# Patient Record
Sex: MALE | Race: WHITE | Employment: FULL TIME
[De-identification: names, ages, dates, MRNs, and addresses within clinical notes are randomized per-mention and may not be internally consistent; named-entity substitution may affect disease eponyms.]

---

## 2017-01-09 ENCOUNTER — TELEPHONE (OUTPATIENT)
Dept: FAMILY MEDICINE CLINIC | Facility: CLINIC | Age: 62
End: 2017-01-09

## 2017-01-09 DIAGNOSIS — E11.40 CONTROLLED TYPE 2 DIABETES WITH NEUROPATHY (HCC): ICD-10-CM

## 2017-01-09 DIAGNOSIS — Z12.5 SPECIAL SCREENING FOR MALIGNANT NEOPLASM OF PROSTATE: ICD-10-CM

## 2017-01-09 DIAGNOSIS — E78.2 MIXED HYPERLIPIDEMIA: ICD-10-CM

## 2017-01-09 DIAGNOSIS — E03.9 HYPOTHYROIDISM, UNSPECIFIED TYPE: Primary | ICD-10-CM

## 2017-01-13 RX ORDER — LEVALBUTEROL TARTRATE 45 UG/1
2 AEROSOL, METERED ORAL EVERY 4 HOURS PRN
Qty: 1 INHALER | Refills: 3 | Status: SHIPPED | OUTPATIENT
Start: 2017-01-13 | End: 2017-04-24 | Stop reason: ALTCHOICE

## 2017-01-18 DIAGNOSIS — E03.9 HYPOTHYROIDISM, UNSPECIFIED TYPE: ICD-10-CM

## 2017-01-18 RX ORDER — LEVOTHYROXINE SODIUM 0.07 MG/1
75 TABLET ORAL DAILY
Qty: 90 TABLET | Refills: 0 | Status: SHIPPED | OUTPATIENT
Start: 2017-01-18 | End: 2017-03-20 | Stop reason: SDUPTHER

## 2017-01-31 ENCOUNTER — OFFICE VISIT (OUTPATIENT)
Dept: FAMILY MEDICINE CLINIC | Facility: CLINIC | Age: 62
End: 2017-01-31

## 2017-01-31 VITALS
HEIGHT: 68 IN | OXYGEN SATURATION: 98 % | BODY MASS INDEX: 31.83 KG/M2 | WEIGHT: 210 LBS | DIASTOLIC BLOOD PRESSURE: 78 MMHG | HEART RATE: 88 BPM | TEMPERATURE: 97.3 F | SYSTOLIC BLOOD PRESSURE: 124 MMHG

## 2017-01-31 DIAGNOSIS — H61.23 HEARING LOSS DUE TO CERUMEN IMPACTION, BILATERAL: ICD-10-CM

## 2017-01-31 DIAGNOSIS — E83.52 SERUM CALCIUM ELEVATED: ICD-10-CM

## 2017-01-31 PROCEDURE — 99214 OFFICE O/P EST MOD 30 MIN: CPT | Performed by: NURSE PRACTITIONER

## 2017-01-31 PROCEDURE — 69210 REMOVE IMPACTED EAR WAX UNI: CPT | Performed by: NURSE PRACTITIONER

## 2017-01-31 ASSESSMENT — ENCOUNTER SYMPTOMS
CHEST TIGHTNESS: 0
BLURRED VISION: 0
ABDOMINAL PAIN: 0
VISUAL CHANGE: 0
SHORTNESS OF BREATH: 0
COUGH: 0

## 2017-02-02 ENCOUNTER — TELEPHONE (OUTPATIENT)
Dept: FAMILY MEDICINE CLINIC | Facility: CLINIC | Age: 62
End: 2017-02-02

## 2017-02-10 RX ORDER — GLYBURIDE-METFORMIN HYDROCHLORIDE 2.5; 5 MG/1; MG/1
1 TABLET ORAL
Qty: 30 TABLET | Refills: 3 | Status: SHIPPED | OUTPATIENT
Start: 2017-02-10 | End: 2017-06-06 | Stop reason: SDUPTHER

## 2017-02-17 RX ORDER — MONTELUKAST SODIUM 10 MG/1
TABLET ORAL
Qty: 90 TABLET | Refills: 1 | Status: SHIPPED | OUTPATIENT
Start: 2017-02-17 | End: 2017-05-19 | Stop reason: SDUPTHER

## 2017-02-17 RX ORDER — QUETIAPINE FUMARATE 100 MG/1
TABLET, FILM COATED ORAL
Qty: 90 TABLET | Refills: 2 | Status: SHIPPED | OUTPATIENT
Start: 2017-02-17

## 2017-02-17 RX ORDER — FENOFIBRATE 145 MG/1
TABLET, COATED ORAL
Qty: 90 TABLET | Refills: 1 | Status: SHIPPED | OUTPATIENT
Start: 2017-02-17 | End: 2017-05-19 | Stop reason: SDUPTHER

## 2017-02-17 RX ORDER — ATORVASTATIN CALCIUM 40 MG/1
TABLET, FILM COATED ORAL
Qty: 90 TABLET | Refills: 1 | Status: SHIPPED | OUTPATIENT
Start: 2017-02-17 | End: 2017-05-19 | Stop reason: SDUPTHER

## 2017-03-20 DIAGNOSIS — E03.9 HYPOTHYROIDISM, UNSPECIFIED TYPE: ICD-10-CM

## 2017-03-20 RX ORDER — LEVOTHYROXINE SODIUM 0.07 MG/1
TABLET ORAL
Qty: 90 TABLET | Refills: 3 | Status: SHIPPED | OUTPATIENT
Start: 2017-03-20

## 2017-03-21 ENCOUNTER — HOSPITAL ENCOUNTER (OUTPATIENT)
Age: 62
Discharge: HOME | End: 2017-03-21
Payer: COMMERCIAL

## 2017-03-21 DIAGNOSIS — E83.52 SERUM CALCIUM ELEVATED: ICD-10-CM

## 2017-03-21 LAB
CALCIUM IONIZED: 1.37 MMOL/L (ref 1.13–1.33)
PTH INTACT: 32.16 PG/ML (ref 15–65)

## 2017-03-21 PROCEDURE — 82330 ASSAY OF CALCIUM: CPT

## 2017-03-21 PROCEDURE — 36415 COLL VENOUS BLD VENIPUNCTURE: CPT

## 2017-03-21 PROCEDURE — 83970 ASSAY OF PARATHORMONE: CPT

## 2017-04-07 RX ORDER — CALCIUM CITRATE/VITAMIN D3 200MG-6.25
TABLET ORAL
Qty: 50 STRIP | Refills: 1 | Status: SHIPPED | OUTPATIENT
Start: 2017-04-07 | End: 2017-07-17 | Stop reason: SDUPTHER

## 2017-04-18 ENCOUNTER — OFFICE VISIT (OUTPATIENT)
Dept: FAMILY MEDICINE CLINIC | Age: 62
End: 2017-04-18
Payer: COMMERCIAL

## 2017-04-18 VITALS — TEMPERATURE: 97.8 F | HEART RATE: 88 BPM | DIASTOLIC BLOOD PRESSURE: 65 MMHG | SYSTOLIC BLOOD PRESSURE: 107 MMHG

## 2017-04-18 DIAGNOSIS — J40 BRONCHITIS: Primary | ICD-10-CM

## 2017-04-18 DIAGNOSIS — R05.9 COUGH: ICD-10-CM

## 2017-04-18 PROCEDURE — 99214 OFFICE O/P EST MOD 30 MIN: CPT | Performed by: NURSE PRACTITIONER

## 2017-04-18 RX ORDER — GUAIFENESIN AND CODEINE PHOSPHATE 100; 10 MG/5ML; MG/5ML
5 SOLUTION ORAL 4 TIMES DAILY PRN
Qty: 118 ML | Refills: 0 | Status: SHIPPED | OUTPATIENT
Start: 2017-04-18 | End: 2017-04-24 | Stop reason: SDUPTHER

## 2017-04-18 RX ORDER — AZITHROMYCIN 250 MG/1
TABLET, FILM COATED ORAL
Qty: 1 PACKET | Refills: 0 | Status: SHIPPED | OUTPATIENT
Start: 2017-04-18 | End: 2018-03-17 | Stop reason: ALTCHOICE

## 2017-04-18 ASSESSMENT — ENCOUNTER SYMPTOMS
VOICE CHANGE: 0
COUGH: 1
EYE DISCHARGE: 0
CHEST TIGHTNESS: 0
SORE THROAT: 0
WHEEZING: 0
SINUS PRESSURE: 0
SHORTNESS OF BREATH: 0
EYE REDNESS: 0

## 2017-04-24 ENCOUNTER — OFFICE VISIT (OUTPATIENT)
Dept: FAMILY MEDICINE CLINIC | Age: 62
End: 2017-04-24
Payer: COMMERCIAL

## 2017-04-24 VITALS
DIASTOLIC BLOOD PRESSURE: 62 MMHG | SYSTOLIC BLOOD PRESSURE: 127 MMHG | BODY MASS INDEX: 30.98 KG/M2 | OXYGEN SATURATION: 98 % | HEIGHT: 68 IN | WEIGHT: 204.4 LBS | TEMPERATURE: 98.6 F | HEART RATE: 96 BPM

## 2017-04-24 DIAGNOSIS — R05.9 COUGH: ICD-10-CM

## 2017-04-24 DIAGNOSIS — J20.9 ACUTE BRONCHITIS, UNSPECIFIED ORGANISM: Primary | ICD-10-CM

## 2017-04-24 PROCEDURE — 99213 OFFICE O/P EST LOW 20 MIN: CPT | Performed by: NURSE PRACTITIONER

## 2017-04-24 RX ORDER — GUAIFENESIN AND CODEINE PHOSPHATE 100; 10 MG/5ML; MG/5ML
5 SOLUTION ORAL 4 TIMES DAILY PRN
Qty: 118 ML | Refills: 0 | Status: SHIPPED | OUTPATIENT
Start: 2017-04-24 | End: 2017-05-01

## 2017-04-24 RX ORDER — ALBUTEROL SULFATE 90 UG/1
2 AEROSOL, METERED RESPIRATORY (INHALATION) EVERY 6 HOURS PRN
Qty: 1 INHALER | Refills: 0 | Status: SHIPPED | OUTPATIENT
Start: 2017-04-24 | End: 2020-03-12 | Stop reason: SDUPTHER

## 2017-04-24 RX ORDER — METHYLPREDNISOLONE 4 MG/1
TABLET ORAL
Qty: 1 KIT | Refills: 0 | Status: SHIPPED | OUTPATIENT
Start: 2017-04-24 | End: 2017-04-30

## 2017-04-24 ASSESSMENT — ENCOUNTER SYMPTOMS
RHINORRHEA: 0
TROUBLE SWALLOWING: 0
SHORTNESS OF BREATH: 0
CONSTIPATION: 0
DIARRHEA: 0
COUGH: 1
SORE THROAT: 0
CHEST TIGHTNESS: 0
APNEA: 0
NAUSEA: 1
WHEEZING: 0
SINUS PRESSURE: 0
STRIDOR: 0
ABDOMINAL PAIN: 0
CHOKING: 0

## 2017-04-27 ENCOUNTER — OFFICE VISIT (OUTPATIENT)
Dept: FAMILY MEDICINE CLINIC | Age: 62
End: 2017-04-27
Payer: COMMERCIAL

## 2017-04-27 VITALS
HEART RATE: 98 BPM | RESPIRATION RATE: 17 BRPM | BODY MASS INDEX: 30.97 KG/M2 | SYSTOLIC BLOOD PRESSURE: 105 MMHG | OXYGEN SATURATION: 98 % | WEIGHT: 204.37 LBS | TEMPERATURE: 98.2 F | HEIGHT: 68 IN | DIASTOLIC BLOOD PRESSURE: 59 MMHG

## 2017-04-27 DIAGNOSIS — R05.9 COUGH: Primary | ICD-10-CM

## 2017-04-27 DIAGNOSIS — J40 BRONCHITIS: ICD-10-CM

## 2017-04-27 PROCEDURE — 99214 OFFICE O/P EST MOD 30 MIN: CPT | Performed by: NURSE PRACTITIONER

## 2017-04-27 RX ORDER — LEVOFLOXACIN 500 MG/1
500 TABLET, FILM COATED ORAL DAILY
Qty: 7 TABLET | Refills: 0 | Status: SHIPPED | OUTPATIENT
Start: 2017-04-27 | End: 2017-05-04

## 2017-04-27 ASSESSMENT — ENCOUNTER SYMPTOMS
WHEEZING: 1
EYE REDNESS: 0
SHORTNESS OF BREATH: 0
SORE THROAT: 0
COUGH: 1
CHEST TIGHTNESS: 0
VOICE CHANGE: 0
SINUS PRESSURE: 0
EYE DISCHARGE: 0

## 2017-05-01 ENCOUNTER — TELEPHONE (OUTPATIENT)
Dept: FAMILY MEDICINE CLINIC | Age: 62
End: 2017-05-01

## 2017-05-01 DIAGNOSIS — R93.89 ABNORMAL CXR: Primary | ICD-10-CM

## 2017-05-25 DIAGNOSIS — K21.9 GASTROESOPHAGEAL REFLUX DISEASE, ESOPHAGITIS PRESENCE NOT SPECIFIED: ICD-10-CM

## 2017-05-25 RX ORDER — MELOXICAM 15 MG/1
TABLET ORAL
Qty: 90 TABLET | Refills: 2 | Status: SHIPPED | OUTPATIENT
Start: 2017-05-25

## 2017-05-25 RX ORDER — OMEPRAZOLE 40 MG/1
40 CAPSULE, DELAYED RELEASE ORAL DAILY
Qty: 90 CAPSULE | Refills: 3 | Status: SHIPPED | OUTPATIENT
Start: 2017-05-25

## 2017-06-06 RX ORDER — GLYBURIDE-METFORMIN HYDROCHLORIDE 2.5; 5 MG/1; MG/1
1 TABLET ORAL
Qty: 30 TABLET | Refills: 3 | Status: SHIPPED | OUTPATIENT
Start: 2017-06-06

## 2018-03-17 ENCOUNTER — OFFICE VISIT (OUTPATIENT)
Dept: FAMILY MEDICINE CLINIC | Age: 63
End: 2018-03-17
Payer: COMMERCIAL

## 2018-03-17 VITALS
HEART RATE: 86 BPM | DIASTOLIC BLOOD PRESSURE: 68 MMHG | HEIGHT: 68 IN | BODY MASS INDEX: 33.34 KG/M2 | RESPIRATION RATE: 18 BRPM | OXYGEN SATURATION: 97 % | WEIGHT: 220 LBS | TEMPERATURE: 97.5 F | SYSTOLIC BLOOD PRESSURE: 138 MMHG

## 2018-03-17 DIAGNOSIS — J01.90 ACUTE BACTERIAL SINUSITIS: Primary | ICD-10-CM

## 2018-03-17 DIAGNOSIS — B96.89 ACUTE BACTERIAL SINUSITIS: Primary | ICD-10-CM

## 2018-03-17 PROCEDURE — 99213 OFFICE O/P EST LOW 20 MIN: CPT | Performed by: INTERNAL MEDICINE

## 2018-03-17 RX ORDER — AZITHROMYCIN 500 MG/1
500 TABLET, FILM COATED ORAL DAILY
Qty: 1 PACKET | Refills: 0 | Status: SHIPPED | OUTPATIENT
Start: 2018-03-17 | End: 2018-03-22

## 2018-03-17 ASSESSMENT — ENCOUNTER SYMPTOMS
SORE THROAT: 1
HOARSE VOICE: 1
COUGH: 1
RHINORRHEA: 1
SINUS PAIN: 0
SINUS PRESSURE: 0
SHORTNESS OF BREATH: 0

## 2018-03-17 ASSESSMENT — PATIENT HEALTH QUESTIONNAIRE - PHQ9
SUM OF ALL RESPONSES TO PHQ9 QUESTIONS 1 & 2: 0
2. FEELING DOWN, DEPRESSED OR HOPELESS: 0
SUM OF ALL RESPONSES TO PHQ QUESTIONS 1-9: 0
1. LITTLE INTEREST OR PLEASURE IN DOING THINGS: 0

## 2018-03-17 NOTE — PROGRESS NOTES
[facility]  [address]  [address] [address]  Dept: [phone]  Dept Fax: [phone]    Kiersten Reynolds is a 57 y.o. male who presents to the urgent care today for his medical conditions/complaints as noted below. Placido Bryant is c/o of Sinus Problem (pt complains of sinus congestion- noticed within the last week. pt states last year he was in the ER with pnuemo.); Cough (pt complains of mucus coming up whe coughing- yellowish ); and Ear Fullness (pt complains of ear blockage in both ears- no pain. )      HPI:     Sinusitis   This is a new problem. The current episode started 1 to 4 weeks ago. The problem has been gradually worsening since onset. There has been no fever. Associated symptoms include congestion, coughing, a hoarse voice and a sore throat. Pertinent negatives include no chills, headaches, shortness of breath or sinus pressure. Treatments tried: singulair at night. The treatment provided no relief.        Past Medical History:   Diagnosis Date    Hyperlipidemia     Hypothyroidism     Insomnia, unspecified     Type II or unspecified type diabetes mellitus without mention of complication, not stated as uncontrolled         Current Outpatient Prescriptions   Medication Sig Dispense Refill    glucose blood VI test strips (TRUE METRIX BLOOD GLUCOSE TEST) strip TEST twice a day 50 strip 1    glyBURIDE-metformin (GLUCOVANCE) 2.5-500 MG per tablet Take 1 tablet by mouth daily (with breakfast) 30 tablet 3    meloxicam (MOBIC) 15 MG tablet Take 1 tablet by mouth once a day 90 tablet 2    omeprazole (PRILOSEC) 40 MG delayed release capsule Take 1 capsule by mouth daily 90 capsule 3    valsartan-hydrochlorothiazide (DIOVAN-HCT) 160-25 MG per tablet Take 1 tablet by mouth  daily 90 tablet 3    atorvastatin (LIPITOR) 40 MG tablet Take 1 tablet by mouth  daily 90 tablet 1    fenofibrate (TRICOR) 145 MG tablet Take 1 tablet by

## 2018-03-17 NOTE — PROGRESS NOTES
Visit Information    Have you changed or started any medications since your last visit including any over-the-counter medicines, vitamins, or herbal medicines? no   Are you having any side effects from any of your medications? -  no  Have you stopped taking any of your medications? Is so, why? -  no    Have you seen any other physician or provider since your last visit? No  Have you had any other diagnostic tests since your last visit? No  Have you been seen in the emergency room and/or had an admission to a hospital since we last saw you? No  Have you had your routine dental cleaning in the past 6 months? no    Have you activated your [employer] account? If not, what are your barriers?  No: code     Patient Care Team:  Pedro Pablo Varela CNP as PCP - General (Nurse Practitioner)    Medical History Review  Past Medical, Family, and Social History reviewed and does contribute to the patient presenting condition    Health Maintenance   Topic Date Due    Hepatitis C screen  1955    HIV screen  10/22/1968    DTaP/Tdap/Td vaccine (1 - Tdap) 10/22/1972    Shingles Vaccine (1 of 2 - 2 Dose Series) 10/22/2003    Diabetic retinal exam  08/26/2016    Diabetic foot exam  11/23/2016    Flu vaccine (1) 09/01/2017    A1C test (Diabetic or Prediabetic)  01/19/2018    Diabetic microalbuminuria test  01/19/2018    Lipid screen  01/19/2018    TSH testing  01/19/2018    Potassium monitoring  01/19/2018    Creatinine monitoring  01/19/2018    Colon cancer screen colonoscopy  09/01/2019    Pneumococcal med risk  Completed

## 2018-04-23 RX ORDER — VALSARTAN AND HYDROCHLOROTHIAZIDE 160; 25 MG/1; MG/1
TABLET ORAL
Qty: 90 TABLET | Refills: 1 | Status: SHIPPED | OUTPATIENT
Start: 2018-04-23

## 2018-08-14 ENCOUNTER — TELEPHONE (OUTPATIENT)
Dept: FAMILY MEDICINE CLINIC | Age: 63
End: 2018-08-14

## 2018-11-05 ENCOUNTER — TELEPHONE (OUTPATIENT)
Dept: FAMILY MEDICINE CLINIC | Age: 63
End: 2018-11-05

## 2019-12-31 ENCOUNTER — TELEPHONE (OUTPATIENT)
Dept: INFECTIOUS DISEASES | Age: 64
End: 2019-12-31

## 2019-12-31 RX ORDER — CEPHALEXIN 500 MG/1
500 CAPSULE ORAL 2 TIMES DAILY
Qty: 14 CAPSULE | Refills: 0 | Status: SHIPPED | OUTPATIENT
Start: 2019-12-31 | End: 2020-01-07

## 2019-12-31 RX ORDER — DOXYCYCLINE HYCLATE 100 MG
100 TABLET ORAL 2 TIMES DAILY
Qty: 14 TABLET | Refills: 0 | Status: SHIPPED | OUTPATIENT
Start: 2019-12-31 | End: 2020-01-07

## 2020-01-01 NOTE — TELEPHONE ENCOUNTER
Patient was discharged from [facility] on 1 week of antibiotic by primary   I want anbx for 2 weeks    I Put the order for one more week supply please let the patient know to fill it once he is done with the one-week supply

## 2020-01-14 DIAGNOSIS — L03.115 CELLULITIS OF RIGHT LOWER EXTREMITY: ICD-10-CM

## 2020-01-14 PROBLEM — Z98.890 HISTORY OF THORACOTOMY: Status: ACTIVE | Noted: 2017-06-08

## 2020-01-14 PROBLEM — L03.90 CELLULITIS: Status: ACTIVE | Noted: 2020-01-14

## 2020-01-14 PROBLEM — J45.20 MILD INTERMITTENT ASTHMA WITHOUT COMPLICATION: Status: ACTIVE | Noted: 2017-06-08

## 2020-01-14 PROBLEM — I10 HYPERTENSION: Status: ACTIVE | Noted: 2020-01-14

## 2020-01-15 ENCOUNTER — OFFICE VISIT (OUTPATIENT)
Dept: INFECTIOUS DISEASES | Age: 65
End: 2020-01-15
Payer: COMMERCIAL

## 2020-01-15 VITALS
TEMPERATURE: 96.9 F | HEART RATE: 85 BPM | SYSTOLIC BLOOD PRESSURE: 112 MMHG | DIASTOLIC BLOOD PRESSURE: 65 MMHG | OXYGEN SATURATION: 99 % | BODY MASS INDEX: 31.02 KG/M2 | WEIGHT: 204 LBS

## 2020-01-15 PROCEDURE — 1036F TOBACCO NON-USER: CPT | Performed by: INTERNAL MEDICINE

## 2020-01-15 PROCEDURE — G8427 DOCREV CUR MEDS BY ELIG CLIN: HCPCS | Performed by: INTERNAL MEDICINE

## 2020-01-15 PROCEDURE — 3017F COLORECTAL CA SCREEN DOC REV: CPT | Performed by: INTERNAL MEDICINE

## 2020-01-15 PROCEDURE — G8417 CALC BMI ABV UP PARAM F/U: HCPCS | Performed by: INTERNAL MEDICINE

## 2020-01-15 PROCEDURE — 99213 OFFICE O/P EST LOW 20 MIN: CPT | Performed by: INTERNAL MEDICINE

## 2020-01-15 PROCEDURE — G8484 FLU IMMUNIZE NO ADMIN: HCPCS | Performed by: INTERNAL MEDICINE

## 2020-01-15 PROCEDURE — 1123F ACP DISCUSS/DSCN MKR DOCD: CPT | Performed by: INTERNAL MEDICINE

## 2020-01-15 PROCEDURE — 4040F PNEUMOC VAC/ADMIN/RCVD: CPT | Performed by: INTERNAL MEDICINE

## 2020-01-15 RX ORDER — CEPHALEXIN 500 MG/1
500 CAPSULE ORAL 2 TIMES DAILY
Qty: 28 CAPSULE | Refills: 0 | Status: SHIPPED | OUTPATIENT
Start: 2020-01-15 | End: 2020-01-29

## 2020-01-15 RX ORDER — DOXYCYCLINE HYCLATE 100 MG
100 TABLET ORAL 2 TIMES DAILY
Qty: 28 TABLET | Refills: 0 | Status: SHIPPED | OUTPATIENT
Start: 2020-01-15 | End: 2020-01-29

## 2020-01-15 NOTE — PROGRESS NOTES
Shortness of Breath, Disp: 1 Inhaler, Rfl: 0    levothyroxine (SYNTHROID) 75 MCG tablet, Take 1 tablet by mouth  daily, Disp: 90 tablet, Rfl: 3    QUEtiapine (SEROQUEL) 100 MG tablet, Take 1 tablet by mouth at bedtime, Disp: 90 tablet, Rfl: 2    aspirin 81 MG tablet, Take 81 mg by mouth daily, Disp: , Rfl:     diclofenac 2 % SOLN, Place 1 applicator onto the skin 4 times daily as needed (wash hands after use), Disp: 1 Bottle, Rfl: 2    cetirizine (ZYRTEC ALLERGY) 10 MG tablet, Take 1 tablet by mouth daily. , Disp: 30 tablet, Rfl: 3    loratadine (CLARITIN) 10 MG tablet, Take 1 tablet by mouth daily. , Disp: 30 tablet, Rfl: 0    fluticasone (FLONASE) 50 MCG/ACT nasal spray, 1 spray by Nasal route 2 times daily. , Disp: 1 Bottle, Rfl: 0    ALPRAZolam (XANAX) 0.5 MG tablet, Take 0.5 mg by mouth nightly as needed for Sleep., Disp: , Rfl:      Review of Systems:  CONSTITUTIONAL:  negative  EYES:  negative  HEENT:  negative  RESPIRATORY:  negative  CARDIOVASCULAR:  negative  GASTROINTESTINAL:  negative  GENITOURINARY:  negative  INTEGUMENT/BREAST:  negative  HEMATOLOGIC/LYMPHATIC:  negative  ALLERGIC/IMMUNOLOGIC:  negative  ENDOCRINE:  negative  MUSCULOSKELETAL:  negative  NEUROLOGICAL:  negative  BEHAVIOR/PSYCH:  negative    /65 (Site: Right Upper Arm, Position: Sitting, Cuff Size: Medium Adult)   Pulse 85   Temp 96.9 °F (36.1 °C) (Oral)   Wt 204 lb (92.5 kg)   SpO2 99%   BMI 31.02 kg/m²       EXAM:  CONSTITUTIONAL:  awake, alert, cooperative, no apparent distress,  EYES: conjunctiva normal  ENT:  Normocephalic, without obvious abnormality, atraumatic,  LUNGS:  No increased work of breathing, good air exchange, clear to auscultation bilaterally, no crackles or wheezing  CARDIOVASCULAR:  regular rate and rhythm, normal S1 and S2,  no murmur noted  ABDOMEN:   normal bowel sounds, soft, non-distended, non-tender, no masses palpated, no hepatosplenomegally,   MUSCULOSKELETAL: rt Lower extremity erythema is

## 2020-01-20 LAB
A/G RATIO: NORMAL
ALBUMIN SERPL-MCNC: NORMAL G/DL
ALP BLD-CCNC: NORMAL U/L
ALT SERPL-CCNC: NORMAL U/L
AST SERPL-CCNC: NORMAL U/L
BILIRUB SERPL-MCNC: NORMAL MG/DL
BILIRUBIN DIRECT: NORMAL
BILIRUBIN, INDIRECT: NORMAL
CREAT SERPL-MCNC: NORMAL MG/DL
GLOBULIN: NORMAL
PROTEIN TOTAL: NORMAL

## 2020-01-29 ENCOUNTER — OFFICE VISIT (OUTPATIENT)
Dept: INFECTIOUS DISEASES | Age: 65
End: 2020-01-29
Payer: COMMERCIAL

## 2020-01-29 VITALS
HEIGHT: 68 IN | TEMPERATURE: 96.7 F | HEART RATE: 92 BPM | OXYGEN SATURATION: 92 % | DIASTOLIC BLOOD PRESSURE: 72 MMHG | SYSTOLIC BLOOD PRESSURE: 109 MMHG | WEIGHT: 204 LBS | BODY MASS INDEX: 30.92 KG/M2

## 2020-01-29 PROCEDURE — G8427 DOCREV CUR MEDS BY ELIG CLIN: HCPCS | Performed by: INTERNAL MEDICINE

## 2020-01-29 PROCEDURE — 1123F ACP DISCUSS/DSCN MKR DOCD: CPT | Performed by: INTERNAL MEDICINE

## 2020-01-29 PROCEDURE — G8417 CALC BMI ABV UP PARAM F/U: HCPCS | Performed by: INTERNAL MEDICINE

## 2020-01-29 PROCEDURE — G8484 FLU IMMUNIZE NO ADMIN: HCPCS | Performed by: INTERNAL MEDICINE

## 2020-01-29 PROCEDURE — 99213 OFFICE O/P EST LOW 20 MIN: CPT | Performed by: INTERNAL MEDICINE

## 2020-01-29 PROCEDURE — 3017F COLORECTAL CA SCREEN DOC REV: CPT | Performed by: INTERNAL MEDICINE

## 2020-01-29 PROCEDURE — 4040F PNEUMOC VAC/ADMIN/RCVD: CPT | Performed by: INTERNAL MEDICINE

## 2020-01-29 PROCEDURE — 1036F TOBACCO NON-USER: CPT | Performed by: INTERNAL MEDICINE

## 2020-01-29 NOTE — PROGRESS NOTES
[facility]    Follow-up NOTE           Visit date: 1/29/2020      Reason for visit /chief complaints   cellulitis    Assessment:     Encounter Diagnosis   Name Primary?  Cellulitis, unspecified cellulitis site Yes      4.4 x 3.5 x 3.0 cm lobulated fluid collection in the soft tissues on the medial aspect of the thigh adjacent to the sartorius muscle as described above. Abnormality is suggesting of cellulitis with abscess formation or hematoma. There is some myositis of the sartorius muscle on MRI 12/31  right lower extremity cellulitis  diabetes  Hyperlipidemia   transaminitis      Plan:   No surgical plans per Ortho according to the patient patient has some phlegmon and scarring developed at sites with no signs of acute infection at this point  Completed doxy and keflex today  No sign of infection   Continue to monitor closely off of anbx   ID clinic in 2 to 3 weeks  Order follow-up LFTs    HPI:      Patient is 38-year-old male came in for follow-up. I am following him for right lower extremity two localized areas cellulitis/myositis with skin soft tissue abscess status post antibiotic treatment. He was sent to orthopedic doctor for evaluation surgical intervention and no plan for surgery at this point. He completed the course of doxy and Keflex today. Overall feeling better no fever or chills.   Denies any pain at the sites    Past Medical History:   Diagnosis Date    Cellulitis 1/14/2020    GERD (gastroesophageal reflux disease) 7/14/2015    History of thoracotomy 6/8/2017    Hyperlipidemia     Hypertension 1/14/2020    Hypothyroidism     Insomnia, unspecified     Mild intermittent asthma without complication 1/4/0591    Mixed hyperlipidemia 1/18/2016    Type II or unspecified type diabetes mellitus without mention of complication, not stated as uncontrolled     Uncontrolled type 2 diabetes mellitus with diabetic polyneuropathy, without long-term current use of insulin ([address] 1/31/2017     Past Surgical History:   Procedure Laterality Date    ARM SURGERY  1980     Social History     Socioeconomic History    Marital status:      Spouse name: None    Number of children: None    Years of education: None    Highest education level: None   Occupational History    None   Social Needs    Financial resource strain: None    Food insecurity:     Worry: None     Inability: None    Transportation needs:     Medical: None     Non-medical: None   Tobacco Use    Smoking status: Never Smoker    Smokeless tobacco: Never Used   Substance and Sexual Activity    Alcohol use: Yes     Comment: rare    Drug use: No    Sexual activity: Yes     Partners: Female   Lifestyle    Physical activity:     Days per week: None     Minutes per session: None    Stress: None   Relationships    Social connections:     Talks on phone: None     Gets together: None     Attends Holiness service: None     Active member of club or organization: None     Attends meetings of clubs or organizations: None     Relationship status: None    Intimate partner violence:     Fear of current or ex partner: None     Emotionally abused: None     Physically abused: None     Forced sexual activity: None   Other Topics Concern    None   Social History Narrative    None     Family History   Problem Relation Age of Onset    Diabetes Mother     Cancer Mother     Heart Disease Father     High Blood Pressure Father     High Cholesterol Father        Current med     Current Outpatient Medications:     metoprolol tartrate (LOPRESSOR) 25 MG tablet, Take 1 tablet by mouth daily, Disp: , Rfl:     doxycycline hyclate (VIBRA-TABS) 100 MG tablet, Take 1 tablet by mouth 2 times daily for 14 days, Disp: 28 tablet, Rfl: 0    cephALEXin (KEFLEX) 500 MG capsule, Take 1 capsule by mouth 2 times daily for 14 days, Disp: 28 capsule, Rfl: 0    doxycycline hyclate (VIBRAMYCIN) 100 MG capsule, Take 100 mg by mouth 2 times daily, Disp: , Rfl:     alclomethasone (ACLOVATE) 0.05 % cream, Apply topically 2 times daily. , Disp: 45 g, Rfl: 2    Dermatological Products, Misc. (HYLATOPIC PLUS) LOTN, Apply 1 applicator topically 2 times daily, Disp: 420 mL, Rfl: 2    ciclopirox (LOPROX) 0.77 % cream, Apply topically 2 times daily. , Disp: 30 g, Rfl: 1    valsartan-hydrochlorothiazide (DIOVAN-HCT) 160-25 MG per tablet, TAKE 1 TABLET BY MOUTH  DAILY, Disp: 90 tablet, Rfl: 1    glucose blood VI test strips (TRUE METRIX BLOOD GLUCOSE TEST) strip, TEST twice a day, Disp: 50 strip, Rfl: 1    meloxicam (MOBIC) 15 MG tablet, Take 1 tablet by mouth once a day, Disp: 90 tablet, Rfl: 2    omeprazole (PRILOSEC) 40 MG delayed release capsule, Take 1 capsule by mouth daily, Disp: 90 capsule, Rfl: 3    atorvastatin (LIPITOR) 40 MG tablet, Take 1 tablet by mouth  daily, Disp: 90 tablet, Rfl: 1    fenofibrate (TRICOR) 145 MG tablet, Take 1 tablet by mouth  daily, Disp: 90 tablet, Rfl: 1    montelukast (SINGULAIR) 10 MG tablet, Take 1 tablet by mouth  nightly, Disp: 90 tablet, Rfl: 1    albuterol sulfate  (90 BASE) MCG/ACT inhaler, Inhale 2 puffs into the lungs every 6 hours as needed for Wheezing or Shortness of Breath, Disp: 1 Inhaler, Rfl: 0    levothyroxine (SYNTHROID) 75 MCG tablet, Take 1 tablet by mouth  daily, Disp: 90 tablet, Rfl: 3    QUEtiapine (SEROQUEL) 100 MG tablet, Take 1 tablet by mouth at bedtime, Disp: 90 tablet, Rfl: 2    aspirin 81 MG tablet, Take 81 mg by mouth daily, Disp: , Rfl:     diclofenac 2 % SOLN, Place 1 applicator onto the skin 4 times daily as needed (wash hands after use), Disp: 1 Bottle, Rfl: 2    cetirizine (ZYRTEC ALLERGY) 10 MG tablet, Take 1 tablet by mouth daily. , Disp: 30 tablet, Rfl: 3    loratadine (CLARITIN) 10 MG tablet, Take 1 tablet by mouth daily. , Disp: 30 tablet, Rfl: 0    fluticasone (FLONASE) 50 MCG/ACT nasal spray, 1 spray by Nasal route 2 times daily. , Disp: 1 Bottle, Rfl: 0    ALPRAZolam Corine Chavez

## 2020-02-19 ENCOUNTER — OFFICE VISIT (OUTPATIENT)
Dept: INFECTIOUS DISEASES | Age: 65
End: 2020-02-19
Payer: COMMERCIAL

## 2020-02-19 ENCOUNTER — TELEPHONE (OUTPATIENT)
Dept: INFECTIOUS DISEASES | Age: 65
End: 2020-02-19

## 2020-02-19 VITALS
DIASTOLIC BLOOD PRESSURE: 70 MMHG | HEIGHT: 68 IN | BODY MASS INDEX: 31.1 KG/M2 | OXYGEN SATURATION: 97 % | HEART RATE: 78 BPM | TEMPERATURE: 97 F | WEIGHT: 205.2 LBS | SYSTOLIC BLOOD PRESSURE: 122 MMHG

## 2020-02-19 PROCEDURE — G8427 DOCREV CUR MEDS BY ELIG CLIN: HCPCS | Performed by: INTERNAL MEDICINE

## 2020-02-19 PROCEDURE — 1036F TOBACCO NON-USER: CPT | Performed by: INTERNAL MEDICINE

## 2020-02-19 PROCEDURE — 3017F COLORECTAL CA SCREEN DOC REV: CPT | Performed by: INTERNAL MEDICINE

## 2020-02-19 PROCEDURE — G8417 CALC BMI ABV UP PARAM F/U: HCPCS | Performed by: INTERNAL MEDICINE

## 2020-02-19 PROCEDURE — 4040F PNEUMOC VAC/ADMIN/RCVD: CPT | Performed by: INTERNAL MEDICINE

## 2020-02-19 PROCEDURE — G8484 FLU IMMUNIZE NO ADMIN: HCPCS | Performed by: INTERNAL MEDICINE

## 2020-02-19 PROCEDURE — 99212 OFFICE O/P EST SF 10 MIN: CPT | Performed by: INTERNAL MEDICINE

## 2020-02-19 PROCEDURE — 1123F ACP DISCUSS/DSCN MKR DOCD: CPT | Performed by: INTERNAL MEDICINE

## 2020-02-19 NOTE — PROGRESS NOTES
type 2 diabetes mellitus with diabetic polyneuropathy, without long-term current use of insulin ([facility] 1/31/2017     Past Surgical History:   Procedure Laterality Date    ARM SURGERY  1980     Social History     Socioeconomic History    Marital status:      Spouse name: None    Number of children: None    Years of education: None    Highest education level: None   Occupational History    None   Social Needs    Financial resource strain: None    Food insecurity:     Worry: None     Inability: None    Transportation needs:     Medical: None     Non-medical: None   Tobacco Use    Smoking status: Never Smoker    Smokeless tobacco: Never Used   Substance and Sexual Activity    Alcohol use: Yes     Comment: rare    Drug use: No    Sexual activity: Yes     Partners: Female   Lifestyle    Physical activity:     Days per week: None     Minutes per session: None    Stress: None   Relationships    Social connections:     Talks on phone: None     Gets together: None     Attends Cheondoism service: None     Active member of club or organization: None     Attends meetings of clubs or organizations: None     Relationship status: None    Intimate partner violence:     Fear of current or ex partner: None     Emotionally abused: None     Physically abused: None     Forced sexual activity: None   Other Topics Concern    None   Social History Narrative    None     Family History   Problem Relation Age of Onset    Diabetes Mother     Cancer Mother     Heart Disease Father     High Blood Pressure Father     High Cholesterol Father        Current med     Current Outpatient Medications:     metoprolol tartrate (LOPRESSOR) 25 MG tablet, Take 1 tablet by mouth daily, Disp: , Rfl:     doxycycline hyclate (VIBRAMYCIN) 100 MG capsule, Take 100 mg by mouth 2 times daily, Disp: , Rfl:     alclomethasone (ACLOVATE) 0.05 % cream, Apply topically 2 times daily. , Disp: 45 g, Rfl: 2    Dermatological Products, Misc. tablet, Take 0.5 mg by mouth nightly as needed for Sleep., Disp: , Rfl:      Review of Systems:  CONSTITUTIONAL:  negative  EYES:  negative  HEENT:  negative  RESPIRATORY:  negative  CARDIOVASCULAR:  negative  GASTROINTESTINAL:  negative  GENITOURINARY:  negative  INTEGUMENT/BREAST:  negative  HEMATOLOGIC/LYMPHATIC:  negative  ALLERGIC/IMMUNOLOGIC:  negative  ENDOCRINE:  negative  MUSCULOSKELETAL:  negative  NEUROLOGICAL:  negative  BEHAVIOR/PSYCH:  negative    /70 (Site: Right Upper Arm, Position: Sitting, Cuff Size: Medium Adult)   Pulse 78   Temp 97 °F (36.1 °C) (Oral)   Ht 5' 8\" (1.727 m)   Wt 205 lb 3.2 oz (93.1 kg)   SpO2 97%   BMI 31.20 kg/m²       EXAM:  CONSTITUTIONAL:  awake, alert, cooperative, no apparent distress,  EYES: conjunctiva normal  ENT:  Normocephalic, without obvious abnormality, atraumatic,  LUNGS:  No increased work of breathing, good air exchange, clear to auscultation bilaterally, no crackles or wheezing  CARDIOVASCULAR:  regular rate and rhythm, normal S1 and S2,  no murmur noted  ABDOMEN:   normal bowel sounds, soft, non-distended, non-tender, no masses palpated, no hepatosplenomegally,   MUSCULOSKELETAL: Right lower externally with localized areas of induration with no erythema warmth   SKIN:  No rash      Data Review:     reviewed   IMAGING:          Connie Romero MD  2/19/2020  12:17 PM      This note was completed using a voice transcription system. Every effort was made to ensure accuracy. However, inadvertent computerized transcription errors may be present.

## 2020-07-01 ENCOUNTER — HOSPITAL ENCOUNTER (OUTPATIENT)
Age: 65
Setting detail: SPECIMEN
Discharge: HOME | End: 2020-07-01
Payer: COMMERCIAL

## 2020-07-01 ENCOUNTER — OFFICE VISIT (OUTPATIENT)
Dept: INFECTIOUS DISEASES | Age: 65
End: 2020-07-01
Payer: COMMERCIAL

## 2020-07-01 VITALS
DIASTOLIC BLOOD PRESSURE: 68 MMHG | WEIGHT: 219.4 LBS | RESPIRATION RATE: 12 BRPM | HEART RATE: 80 BPM | SYSTOLIC BLOOD PRESSURE: 115 MMHG | TEMPERATURE: 97.8 F | HEIGHT: 68 IN | BODY MASS INDEX: 33.25 KG/M2

## 2020-07-01 LAB — WBC # BLD: 5.9 K/UL (ref 3.5–11.3)

## 2020-07-01 PROCEDURE — 3017F COLORECTAL CA SCREEN DOC REV: CPT | Performed by: INTERNAL MEDICINE

## 2020-07-01 PROCEDURE — 99213 OFFICE O/P EST LOW 20 MIN: CPT | Performed by: INTERNAL MEDICINE

## 2020-07-01 PROCEDURE — G8427 DOCREV CUR MEDS BY ELIG CLIN: HCPCS | Performed by: INTERNAL MEDICINE

## 2020-07-01 PROCEDURE — 4040F PNEUMOC VAC/ADMIN/RCVD: CPT | Performed by: INTERNAL MEDICINE

## 2020-07-01 PROCEDURE — 1123F ACP DISCUSS/DSCN MKR DOCD: CPT | Performed by: INTERNAL MEDICINE

## 2020-07-01 PROCEDURE — 1036F TOBACCO NON-USER: CPT | Performed by: INTERNAL MEDICINE

## 2020-07-01 PROCEDURE — G8417 CALC BMI ABV UP PARAM F/U: HCPCS | Performed by: INTERNAL MEDICINE

## 2020-07-01 RX ORDER — DOXYCYCLINE 100 MG/1
TABLET ORAL
COMMUNITY
Start: 2020-06-25 | End: 2020-07-01

## 2020-07-01 RX ORDER — LOSARTAN POTASSIUM AND HYDROCHLOROTHIAZIDE 12.5; 5 MG/1; MG/1
TABLET ORAL
COMMUNITY
Start: 2020-06-14

## 2020-07-01 NOTE — PROGRESS NOTES
 Food insecurity     Worry: None     Inability: None    Transportation needs     Medical: None     Non-medical: None   Tobacco Use    Smoking status: Never Smoker    Smokeless tobacco: Never Used   Substance and Sexual Activity    Alcohol use: Yes     Comment: rare    Drug use: No    Sexual activity: Yes     Partners: Female   Lifestyle    Physical activity     Days per week: None     Minutes per session: None    Stress: None   Relationships    Social connections     Talks on phone: None     Gets together: None     Attends Episcopalian service: None     Active member of club or organization: None     Attends meetings of clubs or organizations: None     Relationship status: None    Intimate partner violence     Fear of current or ex partner: None     Emotionally abused: None     Physically abused: None     Forced sexual activity: None   Other Topics Concern    None   Social History Narrative    None     Family History   Problem Relation Age of Onset    Diabetes Mother     Cancer Mother     Heart Disease Father     High Blood Pressure Father     High Cholesterol Father        Current med     Current Outpatient Medications:     losartan-hydroCHLOROthiazide (HYZAAR) 50-12.5 MG per tablet, , Disp: , Rfl:     albuterol sulfate  (90 Base) MCG/ACT inhaler, Inhale 2 puffs into the lungs every 6 hours as needed for Wheezing or Shortness of Breath, Disp: 1 Inhaler, Rfl: 0    metoprolol tartrate (LOPRESSOR) 25 MG tablet, Take 1 tablet by mouth daily, Disp: , Rfl:     alclomethasone (ACLOVATE) 0.05 % cream, Apply topically 2 times daily. , Disp: 45 g, Rfl: 2    Dermatological Products, Misc. (HYLATOPIC PLUS) LOTN, Apply 1 applicator topically 2 times daily, Disp: 420 mL, Rfl: 2    ciclopirox (LOPROX) 0.77 % cream, Apply topically 2 times daily. , Disp: 30 g, Rfl: 1    valsartan-hydrochlorothiazide (DIOVAN-HCT) 160-25 MG per tablet, TAKE 1 TABLET BY MOUTH  DAILY, Disp: 90 tablet, Rfl: 1    glucose Left Upper Arm, Position: Sitting, Cuff Size: Small Adult)   Pulse 80   Temp 97.8 °F (36.6 °C) (Temporal)   Resp 12   Ht 5' 8\" (1.727 m)   Wt 219 lb 6.4 oz (99.5 kg)   BMI 33.36 kg/m²       EXAM:  CONSTITUTIONAL:  awake, alert, cooperative, no apparent distress,  EYES: conjunctiva normal  ENT:  Normocephalic, without obvious abnormality, atraumatic,  LUNGS:  No increased work of breathing, good air exchange, clear to auscultation bilaterally, no crackles or wheezing  CARDIOVASCULAR:  regular rate and rhythm, normal S1 and S2,  no murmur noted  ABDOMEN:   normal bowel sounds, soft, non-distended, non-tender, no masses palpated, no hepatosplenomegally,   MUSCULOSKELETAL:  There is no redness, warmth, or swelling of the joints. NEUROLOGIC:  Awake, alert, oriented to name, place and time  SKIN:  No rash      Data Review:    Reviewed    IMAGING:          Narinder Peng MD  7/1/2020  2:44 PM      This note was completed using a voice transcription system. Every effort was made to ensure accuracy. However, inadvertent computerized transcription errors may be present.

## 2020-07-07 LAB
CULTURE: NORMAL
CULTURE: NORMAL
Lab: NORMAL
Lab: NORMAL
SPECIMEN DESCRIPTION: NORMAL
SPECIMEN DESCRIPTION: NORMAL

## 2022-04-30 ENCOUNTER — HOSPITAL ENCOUNTER (EMERGENCY)
Age: 67
Discharge: HOME | End: 2022-04-30
Attending: EMERGENCY MEDICINE
Payer: COMMERCIAL

## 2022-04-30 VITALS
BODY MASS INDEX: 32.58 KG/M2 | HEIGHT: 68 IN | OXYGEN SATURATION: 98 % | SYSTOLIC BLOOD PRESSURE: 147 MMHG | WEIGHT: 215 LBS | RESPIRATION RATE: 16 BRPM | TEMPERATURE: 98.4 F | DIASTOLIC BLOOD PRESSURE: 81 MMHG | HEART RATE: 125 BPM

## 2022-04-30 DIAGNOSIS — L76.82 POSTOPERATIVE SURGICAL COMPLICATION INVOLVING SKIN ASSOCIATED WITH NON-DERMATOLOGIC PROCEDURE, UNSPECIFIED COMPLICATION: Primary | ICD-10-CM

## 2022-04-30 PROCEDURE — 6370000000 HC RX 637 (ALT 250 FOR IP): Performed by: EMERGENCY MEDICINE

## 2022-04-30 PROCEDURE — 99283 EMERGENCY DEPT VISIT LOW MDM: CPT

## 2022-04-30 RX ORDER — DOXYCYCLINE 100 MG/1
100 CAPSULE ORAL ONCE
Status: COMPLETED | OUTPATIENT
Start: 2022-04-30 | End: 2022-04-30

## 2022-04-30 RX ORDER — DOXYCYCLINE HYCLATE 100 MG
100 TABLET ORAL 2 TIMES DAILY
Qty: 20 TABLET | Refills: 0 | Status: SHIPPED | OUTPATIENT
Start: 2022-04-30 | End: 2022-05-10

## 2022-04-30 RX ADMIN — DOXYCYCLINE 100 MG: 100 CAPSULE ORAL at 07:26

## 2022-04-30 ASSESSMENT — ENCOUNTER SYMPTOMS
EYES NEGATIVE: 1
CHEST TIGHTNESS: 0
COLOR CHANGE: 0
SINUS PAIN: 0
SHORTNESS OF BREATH: 0
APNEA: 0
VOMITING: 0

## 2022-04-30 NOTE — ED PROVIDER NOTES
EMERGENCY DEPARTMENT ENCOUNTER    Pt Name: Marge Gupta  MRN: [id number]  [place] 1955  Date of evaluation: 4/30/22  CHIEF COMPLAINT       Chief Complaint   Patient presents with    Foot Problem     HISTORY OF PRESENT ILLNESS   78-year-old male presents emergency room for postoperative bleeding. Patient had left third metatarsal reconstruction on Friday. Patient reported he woke up overnight and felt the foot was wet. He noticed bleeding through the dressing. He put additional dressing on the bandaging. REVIEW OF SYSTEMS     Review of Systems   Constitutional: Negative for chills and diaphoresis. HENT: Negative. Negative for sinus pain and tinnitus. Eyes: Negative. Respiratory: Negative for apnea, chest tightness and shortness of breath. Cardiovascular: Negative for leg swelling. Gastrointestinal: Negative for vomiting. Genitourinary: Negative for difficulty urinating and frequency. Skin: Negative for color change and rash. Neurological: Negative for seizures. Psychiatric/Behavioral: Negative.         PASTMEDICAL HISTORY     Past Medical History:   Diagnosis Date    Cellulitis 1/14/2020    GERD (gastroesophageal reflux disease) 7/14/2015    History of thoracotomy 6/8/2017    Hyperlipidemia     Hypertension 1/14/2020    Hypothyroidism     Insomnia, unspecified     Mild intermittent asthma without complication 1/8/1431    Mixed hyperlipidemia 1/18/2016    Type II or unspecified type diabetes mellitus without mention of complication, not stated as uncontrolled     Uncontrolled type 2 diabetes mellitus with diabetic polyneuropathy, without long-term current use of insulin ([facility] 1/31/2017     Past Problem List  Patient Active Problem List   Diagnosis Code    GERD (gastroesophageal reflux disease) K21.9    Mixed hyperlipidemia E78.2    Hypothyroidism E03.9    Uncontrolled type 2 diabetes mellitus with diabetic polyneuropathy, without long-term current use of insulin ([facility]CC) E11.42, E11.65    History of thoracotomy Z98.890    Mild intermittent asthma without complication S58.20    Cellulitis L03.90    Hypertension I10     SURGICAL HISTORY       Past Surgical History:   Procedure Laterality Date   [address]     CURRENT MEDICATIONS       Current Discharge Medication List      CONTINUE these medications which have NOT CHANGED    Details   Continuous Blood Gluc  (FREESTYLE IRAIDA 14 DAY READER) CODEY 1 Device by Does not apply route continuous  Qty: 1 Device, Refills: 0      Continuous Blood Gluc Sensor (FREESTYLE IRAIDA 14 DAY SENSOR) MISC 1 Device by Does not apply route continuous  Qty: 2 each, Refills: 3      losartan-hydroCHLOROthiazide (HYZAAR) 50-12.5 MG per tablet       albuterol sulfate  (90 Base) MCG/ACT inhaler Inhale 2 puffs into the lungs every 6 hours as needed for Wheezing or Shortness of Breath  Qty: 1 Inhaler, Refills: 0    Associated Diagnoses: Acute bronchitis, unspecified organism      metoprolol tartrate (LOPRESSOR) 25 MG tablet Take 1 tablet by mouth daily      alclomethasone (ACLOVATE) 0.05 % cream Apply topically 2 times daily. Qty: 45 g, Refills: 2      Dermatological Products, Misc. (HYLATOPIC PLUS) LOTN Apply 1 applicator topically 2 times daily  Qty: 420 mL, Refills: 2      ciclopirox (LOPROX) 0.77 % cream Apply topically 2 times daily.   Qty: 30 g, Refills: 1      valsartan-hydrochlorothiazide (DIOVAN-HCT) 160-25 MG per tablet TAKE 1 TABLET BY MOUTH  DAILY  Qty: 90 tablet, Refills: 1      glucose blood VI test strips (TRUE METRIX BLOOD GLUCOSE TEST) strip TEST twice a day  Qty: 50 strip, Refills: 1      glyBURIDE-metformin (GLUCOVANCE) 2.5-500 MG per tablet Take 1 tablet by mouth daily (with breakfast)  Qty: 30 tablet, Refills: 3      meloxicam (MOBIC) 15 MG tablet Take 1 tablet by mouth once a day  Qty: 90 tablet, Refills: 2    Comments: Refill [id number]      omeprazole (PRILOSEC) 40 MG delayed release capsule Take 1 capsule by mouth daily  Qty: 90 capsule, Refills: 3    Associated Diagnoses: Gastroesophageal reflux disease, esophagitis presence not specified      atorvastatin (LIPITOR) 40 MG tablet Take 1 tablet by mouth  daily  Qty: 90 tablet, Refills: 1      fenofibrate (TRICOR) 145 MG tablet Take 1 tablet by mouth  daily  Qty: 90 tablet, Refills: 1      montelukast (SINGULAIR) 10 MG tablet Take 1 tablet by mouth  nightly  Qty: 90 tablet, Refills: 1      levothyroxine (SYNTHROID) 75 MCG tablet Take 1 tablet by mouth  daily  Qty: 90 tablet, Refills: 3    Associated Diagnoses: Hypothyroidism, unspecified type      QUEtiapine (SEROQUEL) 100 MG tablet Take 1 tablet by mouth at bedtime  Qty: 90 tablet, Refills: 2    Comments: Refill [id number]      aspirin 81 MG tablet Take 81 mg by mouth daily      diclofenac 2 % SOLN Place 1 applicator onto the skin 4 times daily as needed (wash hands after use)  Qty: 1 Bottle, Refills: 2    Associated Diagnoses: Primary osteoarthritis of both knees      cetirizine (ZYRTEC ALLERGY) 10 MG tablet Take 1 tablet by mouth daily. Qty: 30 tablet, Refills: 3    Associated Diagnoses: PND (post-nasal drip); Cough      loratadine (CLARITIN) 10 MG tablet Take 1 tablet by mouth daily. Qty: 30 tablet, Refills: 0      fluticasone (FLONASE) 50 MCG/ACT nasal spray 1 spray by Nasal route 2 times daily. Qty: 1 Bottle, Refills: 0      ALPRAZolam (XANAX) 0.5 MG tablet Take 0.5 mg by mouth nightly as needed for Sleep. ALLERGIES     is allergic to hydrocodone, penicillins, and vicodin [hydrocodone-acetaminophen]. FAMILY HISTORY     He indicated that his mother is . He indicated that his father is .      SOCIAL HISTORY       Social History     Tobacco Use    Smoking status: Never Smoker    Smokeless tobacco: Never Used   Substance Use Topics    Alcohol use: Yes     Comment: rare    Drug use: No     PHYSICAL EXAM     INITIAL VITALS: BP (!) 147/81   Pulse 125   Temp 98.4 °F (36.9 °C) (Oral)   Resp 16   Ht 5' 8\" (1.727 m)   Wt 215 lb (97.5 kg)   SpO2 98%   BMI 32.69 kg/m²    Physical Exam  Constitutional:       General: He is not in acute distress. Appearance: He is well-developed. HENT:      Head: Normocephalic. Eyes:      Pupils: Pupils are equal, round, and reactive to light. Cardiovascular:      Rate and Rhythm: Normal rate and regular rhythm. Heart sounds: Normal heart sounds. Pulmonary:      Effort: Pulmonary effort is normal. No respiratory distress. Breath sounds: Normal breath sounds. Musculoskeletal:         General: Normal range of motion. Feet:    Feet:      Comments: Fresh incision with appropriate stitching no active bleeding  There is a pin placed in the third toe  Skin:     General: Skin is warm and dry. Neurological:      Mental Status: He is alert and oriented to person, place, and time. MEDICAL DECISION MAKING:     Alert oriented nondistressed 25-year-old male presenting to the emergency room with bleeding 1 day postop. Patient's pain seems to be well controlled he is most concerned about the bleeding. On exam he has a appropriately stitched incision with no active bleeding however did have fresh blood on the gauze bandaging. Case discussed with podiatry who did evaluate the patient and rewrapped his foot. Patient encouraged on continued wound care for home and return precautions. CRITICAL CARE:       PROCEDURES:    Procedures    DIAGNOSTIC RESULTS   EKG:All EKG's are interpreted by the Emergency Department Physician who either signs or Co-signs this chart in the absence of a cardiologist.        RADIOLOGY:All plain film, CT, MRI, and formal ultrasound images (except ED bedside ultrasound) are read by the radiologist, see reports below, unless otherwisenoted in MDM or here. No orders to display     LABS: All lab results were reviewed by myself, and all abnormals are listed below.   Labs Reviewed - No data to display    EMERGENCY DEPARTMENTCOURSE:         Vitals:    Vitals:    04/30/22 0616   BP: (!) 147/81   Pulse: 125   Resp: 16   Temp: 98.4 °F (36.9 °C)   TempSrc: Oral   SpO2: 98%   Weight: 215 lb (97.5 kg)   Height: 5' 8\" (1.727 m)       The patient was given the following medications while in the emergency department:  No orders of the defined types were placed in this encounter. CONSULTS:  None    FINAL IMPRESSION      1. Postoperative surgical complication involving skin associated with non-dermatologic procedure, unspecified complication          DISPOSITION/PLAN   DISPOSITION Discharge - Pending Orders Complete 04/30/2022 06:45:31 AM      PATIENT REFERRED TO:  Ambrocio Rios, [address]  [phone]    Schedule an appointment as soon as possible for a visit in 1 week      DISCHARGE MEDICATIONS:  Current Discharge Medication List        Joon Hoffman MD  Attending Emergency Physician      Care during this encounter was due to an unprecedented national emergency due to COVID-19.             Dread Figueroa MD  04/30/22 9975

## 2022-04-30 NOTE — ED NOTES
Patient arrives to ED s/p surgery on left foot Thursday by Dr Terrence Ingram. Patient states that this morning he got up to check his blood sugar and noticed moisture on the dressing. Reinforced dressing and came in to ED. RN at bedside with Dr. Sabiha [place] when dressing removed. Incision intact and no active bleeding noted. Call placed to podiatry.      Maynor Jiang RN  04/30/22 1311

## 2023-09-05 PROBLEM — M86.171 OSTEOMYELITIS OF FOOT, RIGHT, ACUTE (HCC): Status: ACTIVE | Noted: 2020-10-06

## 2023-09-06 ENCOUNTER — NURSE ONLY (OUTPATIENT)
Dept: LAB | Age: 68
End: 2023-09-06